# Patient Record
Sex: MALE | Race: BLACK OR AFRICAN AMERICAN | NOT HISPANIC OR LATINO | Employment: OTHER | URBAN - METROPOLITAN AREA
[De-identification: names, ages, dates, MRNs, and addresses within clinical notes are randomized per-mention and may not be internally consistent; named-entity substitution may affect disease eponyms.]

---

## 2020-04-23 ENCOUNTER — HOSPITAL ENCOUNTER (EMERGENCY)
Facility: HOSPITAL | Age: 40
Discharge: HOME | End: 2020-04-23
Attending: EMERGENCY MEDICINE | Admitting: EMERGENCY MEDICINE

## 2020-04-23 ENCOUNTER — APPOINTMENT (OUTPATIENT)
Dept: GENERAL RADIOLOGY | Facility: HOSPITAL | Age: 40
End: 2020-04-23

## 2020-04-23 VITALS
HEIGHT: 66 IN | TEMPERATURE: 98 F | OXYGEN SATURATION: 98 % | DIASTOLIC BLOOD PRESSURE: 76 MMHG | HEART RATE: 92 BPM | WEIGHT: 134 LBS | SYSTOLIC BLOOD PRESSURE: 113 MMHG | BODY MASS INDEX: 21.53 KG/M2 | RESPIRATION RATE: 16 BRPM

## 2020-04-23 DIAGNOSIS — R07.89 CHEST WALL PAIN: ICD-10-CM

## 2020-04-23 DIAGNOSIS — R09.1 PLEURISY: Primary | ICD-10-CM

## 2020-04-23 LAB
ALBUMIN SERPL-MCNC: 4.7 G/DL (ref 3.5–5.2)
ALBUMIN/GLOB SERPL: 1.6 G/DL
ALP SERPL-CCNC: 334 U/L (ref 39–117)
ALT SERPL W P-5'-P-CCNC: 16 U/L (ref 1–41)
ANION GAP SERPL CALCULATED.3IONS-SCNC: 17.3 MMOL/L (ref 5–15)
AST SERPL-CCNC: 26 U/L (ref 1–40)
BASOPHILS # BLD AUTO: 0.04 10*3/MM3 (ref 0–0.2)
BASOPHILS NFR BLD AUTO: 0.6 % (ref 0–1.5)
BILIRUB SERPL-MCNC: 2 MG/DL (ref 0.2–1.2)
BUN BLD-MCNC: 8 MG/DL (ref 6–20)
BUN/CREAT SERPL: 9.5 (ref 7–25)
CALCIUM SPEC-SCNC: 6.1 MG/DL (ref 8.6–10.5)
CHLORIDE SERPL-SCNC: 99 MMOL/L (ref 98–107)
CO2 SERPL-SCNC: 23.7 MMOL/L (ref 22–29)
CREAT BLD-MCNC: 0.84 MG/DL (ref 0.76–1.27)
D DIMER PPP FEU-MCNC: 0.29 MCGFEU/ML (ref 0–0.49)
DEPRECATED RDW RBC AUTO: 44.9 FL (ref 37–54)
EOSINOPHIL # BLD AUTO: 0.01 10*3/MM3 (ref 0–0.4)
EOSINOPHIL NFR BLD AUTO: 0.1 % (ref 0.3–6.2)
ERYTHROCYTE [DISTWIDTH] IN BLOOD BY AUTOMATED COUNT: 13.1 % (ref 12.3–15.4)
GFR SERPL CREATININE-BSD FRML MDRD: 123 ML/MIN/1.73
GLOBULIN UR ELPH-MCNC: 3 GM/DL
GLUCOSE BLD-MCNC: 98 MG/DL (ref 65–99)
HCT VFR BLD AUTO: 37.8 % (ref 37.5–51)
HGB BLD-MCNC: 13.3 G/DL (ref 13–17.7)
IMM GRANULOCYTES # BLD AUTO: 0.01 10*3/MM3 (ref 0–0.05)
IMM GRANULOCYTES NFR BLD AUTO: 0.1 % (ref 0–0.5)
LYMPHOCYTES # BLD AUTO: 1.34 10*3/MM3 (ref 0.7–3.1)
LYMPHOCYTES NFR BLD AUTO: 19.9 % (ref 19.6–45.3)
MCH RBC QN AUTO: 33.3 PG (ref 26.6–33)
MCHC RBC AUTO-ENTMCNC: 35.2 G/DL (ref 31.5–35.7)
MCV RBC AUTO: 94.7 FL (ref 79–97)
MONOCYTES # BLD AUTO: 0.33 10*3/MM3 (ref 0.1–0.9)
MONOCYTES NFR BLD AUTO: 4.9 % (ref 5–12)
NEUTROPHILS # BLD AUTO: 5.02 10*3/MM3 (ref 1.7–7)
NEUTROPHILS NFR BLD AUTO: 74.4 % (ref 42.7–76)
NRBC BLD AUTO-RTO: 0 /100 WBC (ref 0–0.2)
PLATELET # BLD AUTO: 238 10*3/MM3 (ref 140–450)
PMV BLD AUTO: 9.9 FL (ref 6–12)
POTASSIUM BLD-SCNC: 3.3 MMOL/L (ref 3.5–5.2)
PROT SERPL-MCNC: 7.7 G/DL (ref 6–8.5)
RBC # BLD AUTO: 3.99 10*6/MM3 (ref 4.14–5.8)
SODIUM BLD-SCNC: 140 MMOL/L (ref 136–145)
TROPONIN T SERPL-MCNC: <0.01 NG/ML (ref 0–0.03)
WBC NRBC COR # BLD: 6.75 10*3/MM3 (ref 3.4–10.8)

## 2020-04-23 PROCEDURE — 71045 X-RAY EXAM CHEST 1 VIEW: CPT

## 2020-04-23 PROCEDURE — 93005 ELECTROCARDIOGRAM TRACING: CPT | Performed by: PHYSICIAN ASSISTANT

## 2020-04-23 PROCEDURE — 85025 COMPLETE CBC W/AUTO DIFF WBC: CPT | Performed by: PHYSICIAN ASSISTANT

## 2020-04-23 PROCEDURE — 84484 ASSAY OF TROPONIN QUANT: CPT | Performed by: PHYSICIAN ASSISTANT

## 2020-04-23 PROCEDURE — 99283 EMERGENCY DEPT VISIT LOW MDM: CPT

## 2020-04-23 PROCEDURE — 85379 FIBRIN DEGRADATION QUANT: CPT | Performed by: PHYSICIAN ASSISTANT

## 2020-04-23 PROCEDURE — 80053 COMPREHEN METABOLIC PANEL: CPT | Performed by: PHYSICIAN ASSISTANT

## 2020-04-23 PROCEDURE — 93010 ELECTROCARDIOGRAM REPORT: CPT | Performed by: INTERNAL MEDICINE

## 2020-04-23 RX ORDER — SODIUM CHLORIDE 0.9 % (FLUSH) 0.9 %
10 SYRINGE (ML) INJECTION AS NEEDED
Status: DISCONTINUED | OUTPATIENT
Start: 2020-04-23 | End: 2020-04-23 | Stop reason: HOSPADM

## 2020-04-23 RX ORDER — DICLOFENAC SODIUM 75 MG/1
75 TABLET, DELAYED RELEASE ORAL 2 TIMES DAILY PRN
Qty: 20 TABLET | Refills: 0 | Status: SHIPPED | OUTPATIENT
Start: 2020-04-23

## 2020-04-23 NOTE — ED PROVIDER NOTES
14:18  Patient seen and examined with physician assistant.  Briefly patient has pleuritic upper back pain that started a day ago.  Pain worse with movement and deep breathing no pain with rest.  No fevers      On exam patient has equal breath sounds and heart is regular.  No tenderness.    EKG          EKG time: 1414  Rhythm/Rate: Normal sinus rhythm rate of 91  P waves and ID: Normal P waves  QRS, axis: QRS  ST and T waves: ST segment with J-point elevation consistent with early repolarization    Interpreted Contemporaneously by me, independently viewed  No previous.      Plan is lab work and x-ray.    MD ATTESTATION NOTE    The CLEVE and I have discussed this patient's history, physical exam, and treatment plan.  I have reviewed the documentation and personally had a face to face interaction with the patient. I affirm the documentation and agree with the treatment and plan.  The attached note describes my personal findings.           Edgard Loaiza MD  04/23/20 [id number]

## 2020-04-23 NOTE — ED TRIAGE NOTES
"Patient presents to er via private vehicle from home.  Patient is reporting \"pain between shoulder blades\" and difficulty taking a deep breath since last night.  Patient was placed in face mask during first look triage.  Patient was wearing a face mask throughout encounter.  I wore personal protective equipment throughout the encounter.  Hand hygiene was performed before and after patient encounter.     "

## 2020-04-23 NOTE — ED PROVIDER NOTES
EMERGENCY DEPARTMENT ENCOUNTER    Room Number:  40/40  Date of encounter:  4/23/2020  PCP: System, Provider Not In  Historian: Patient      HPI:  Chief Complaint: Chest pain  A complete HPI/ROS/PMH/PSH/SH/FH are unobtainable due to: Nothing    Context: Artur Phillips is a 39 y.o. male who presents to the ED c/o 2-day history of pleuritic-like chest and back pain.  Patient states the pain is intermittent.  Patient states he was sitting on his couch when he first noticed.  He states the pain occurs in his chest and back when he takes a deep breath.  He denies any radiation of pain.  He denies any fever, cough.  He denies any symptoms while at rest.  Patient denies any known personal or family history of heart disease or clotting disorders.      PAST MEDICAL HISTORY  Active Ambulatory Problems     Diagnosis Date Noted   • No Active Ambulatory Problems     Resolved Ambulatory Problems     Diagnosis Date Noted   • No Resolved Ambulatory Problems     No Additional Past Medical History         PAST SURGICAL HISTORY  No past surgical history on file.      FAMILY HISTORY  No family history on file.      SOCIAL HISTORY  Social History     Socioeconomic History   • Marital status: Single     Spouse name: Not on file   • Number of children: Not on file   • Years of education: Not on file   • Highest education level: Not on file         ALLERGIES  Patient has no known allergies.        REVIEW OF SYSTEMS    All systems reviewed and negative except for those discussed in HPI.       PHYSICAL EXAM    I have reviewed the triage vital signs and nursing notes.    ED Triage Vitals   Temp Heart Rate Resp BP SpO2   04/23/20 1345 04/23/20 1345 04/23/20 1345 04/23/20 1356 04/23/20 1345   98.4 °F (36.9 °C) 114 15 123/78 99 %      Temp src Heart Rate Source Patient Position BP Location FiO2 (%)   04/23/20 1345 04/23/20 1356 04/23/20 1356 04/23/20 1356 --   Tympanic Monitor Lying Left arm        Physical Exam  GENERAL: Alert, oriented.  Not  distressed  HENT: nares patent, head atraumatic  EYES: no scleral icterus, EOMI  CV: regular rhythm, regular rate, no murmur, chest nontender   RESPIRATORY: normal effort, CTA  ABDOMEN: soft, nontender  MUSCULOSKELETAL: no deformity, FROM, no pedal edema or calf tenderness  NEURO: alert, moves all extremities, follows commands  SKIN: warm, dry        LAB RESULTS  Recent Results (from the past 24 hour(s))   Comprehensive Metabolic Panel    Collection Time: 04/23/20  2:03 PM   Result Value Ref Range    Glucose 98 65 - 99 mg/dL    BUN 8 6 - 20 mg/dL    Creatinine 0.84 0.76 - 1.27 mg/dL    Sodium 140 136 - 145 mmol/L    Potassium 3.3 (L) 3.5 - 5.2 mmol/L    Chloride 99 98 - 107 mmol/L    CO2 23.7 22.0 - 29.0 mmol/L    Calcium 6.1 (L) 8.6 - 10.5 mg/dL    Total Protein 7.7 6.0 - 8.5 g/dL    Albumin 4.70 3.50 - 5.20 g/dL    ALT (SGPT) 16 1 - 41 U/L    AST (SGOT) 26 1 - 40 U/L    Alkaline Phosphatase 334 (H) 39 - 117 U/L    Total Bilirubin 2.0 (H) 0.2 - 1.2 mg/dL    eGFR  African Amer 123 >60 mL/min/1.73    Globulin 3.0 gm/dL    A/G Ratio 1.6 g/dL    BUN/Creatinine Ratio 9.5 7.0 - 25.0    Anion Gap 17.3 (H) 5.0 - 15.0 mmol/L   Troponin    Collection Time: 04/23/20  2:03 PM   Result Value Ref Range    Troponin T <0.010 0.000 - 0.030 ng/mL   D-dimer, Quantitative    Collection Time: 04/23/20  2:03 PM   Result Value Ref Range    D-Dimer, Quantitative 0.29 0.00 - 0.49 MCGFEU/mL   CBC Auto Differential    Collection Time: 04/23/20  2:03 PM   Result Value Ref Range    WBC 6.75 3.40 - 10.80 10*3/mm3    RBC 3.99 (L) 4.14 - 5.80 10*6/mm3    Hemoglobin 13.3 13.0 - 17.7 g/dL    Hematocrit 37.8 37.5 - 51.0 %    MCV 94.7 79.0 - 97.0 fL    MCH 33.3 (H) 26.6 - 33.0 pg    MCHC 35.2 31.5 - 35.7 g/dL    RDW 13.1 12.3 - 15.4 %    RDW-SD 44.9 37.0 - 54.0 fl    MPV 9.9 6.0 - 12.0 fL    Platelets 238 140 - 450 10*3/mm3    Neutrophil % 74.4 42.7 - 76.0 %    Lymphocyte % 19.9 19.6 - 45.3 %    Monocyte % 4.9 (L) 5.0 - 12.0 %    Eosinophil % 0.1 (L)  0.3 - 6.2 %    Basophil % 0.6 0.0 - 1.5 %    Immature Grans % 0.1 0.0 - 0.5 %    Neutrophils, Absolute 5.02 1.70 - 7.00 10*3/mm3    Lymphocytes, Absolute 1.34 0.70 - 3.10 10*3/mm3    Monocytes, Absolute 0.33 0.10 - 0.90 10*3/mm3    Eosinophils, Absolute 0.01 0.00 - 0.40 10*3/mm3    Basophils, Absolute 0.04 0.00 - 0.20 10*3/mm3    Immature Grans, Absolute 0.01 0.00 - 0.05 10*3/mm3    nRBC 0.0 0.0 - 0.2 /100 WBC       Ordered the above labs and independently reviewed the results.        RADIOLOGY  Xr Chest 1 View    Result Date: 4/23/2020  XR CHEST 1 VW-  HISTORY: Male who is 39 years-old,  chest pain  TECHNIQUE: Frontal views of the chest  COMPARISON: None available  FINDINGS: Heart, mediastinum and pulmonary vasculature are unremarkable. No focal pulmonary consolidation, pleural effusion, or pneumothorax. No acute osseous process.      No evidence for acute pulmonary process. Follow-up as clinical indications persist.  This report was finalized on 4/23/2020 2:16 PM by Dr. Jaswinder Gleason M.D.        I ordered the above noted radiological studies. Reviewed by me and discussed with radiologist.  See dictation for official radiology interpretation.    Heart Score : 0     PROGRESS, DATA ANALYSIS, CONSULTS, AND MEDICAL DECISION MAKING    All labs have been independently reviewed by me.  All radiology studies have been reviewed by me and discussed with radiologist dictating the report.   EKG's independently viewed and interpreted by me.  Discussion below represents my analysis of pertinent findings related to patient's condition, differential diagnosis, treatment plan and final disposition.      ED Course as of Apr 23 1750   Thu Apr 23, 2020   1357 Patient presents with 2-day history of pleuritic-like chest and back pain.  He denies any fever cough, prior heart history.  Plan to obtain EKG, chest x-ray blood work.  Differential diagnosis includes but not limited to pleurisy, PE, costochondritis, chest wall strain,  acute MI.    [EE]   1444 Patient has a negative work-up here.  No evidence of ACS, pulmonary embolism, pneumothorax, arrhythmia.  I suspect likely musculoskeletal versus pleurisy etiology.  We will treat with NSAIDs and close follow-up.    [EE]      ED Course User Index  [EE] Osman Yeager PA     AS OF 17:50 VITALS:    BP - 113/76  HR - 92  TEMP - 98 °F (36.7 °C) (Tympanic)  O2 SATS - 98%        DIAGNOSIS  Final diagnoses:   Pleurisy   Chest wall pain         DISPOSITION  Discharged             Osman Yeager PA  04/23/20 8319